# Patient Record
Sex: MALE | Race: WHITE
[De-identification: names, ages, dates, MRNs, and addresses within clinical notes are randomized per-mention and may not be internally consistent; named-entity substitution may affect disease eponyms.]

---

## 2018-08-15 ENCOUNTER — HOSPITAL ENCOUNTER (EMERGENCY)
Dept: HOSPITAL 25 - ED | Age: 83
Discharge: HOME | End: 2018-08-15
Payer: MEDICARE

## 2018-08-15 VITALS — DIASTOLIC BLOOD PRESSURE: 75 MMHG | SYSTOLIC BLOOD PRESSURE: 128 MMHG

## 2018-08-15 DIAGNOSIS — Z87.891: ICD-10-CM

## 2018-08-15 DIAGNOSIS — E11.9: ICD-10-CM

## 2018-08-15 DIAGNOSIS — I10: Primary | ICD-10-CM

## 2018-08-15 DIAGNOSIS — K58.9: ICD-10-CM

## 2018-08-15 LAB
BASOPHILS # BLD AUTO: 0.1 10^3/UL (ref 0–0.2)
EOSINOPHIL # BLD AUTO: 0.1 10^3/UL (ref 0–0.6)
HCT VFR BLD AUTO: 32 % (ref 42–52)
HGB BLD-MCNC: 11 G/DL (ref 14–18)
LYMPHOCYTES # BLD AUTO: 0.9 10^3/UL (ref 1–4.8)
MCH RBC QN AUTO: 31 PG (ref 27–31)
MCHC RBC AUTO-ENTMCNC: 35 G/DL (ref 31–36)
MCV RBC AUTO: 90 FL (ref 80–94)
MONOCYTES # BLD AUTO: 0.5 10^3/UL (ref 0–0.8)
NEUTROPHILS # BLD AUTO: 5.9 10^3/UL (ref 1.5–7.7)
NRBC # BLD AUTO: 0 10^3/UL
NRBC BLD QL AUTO: 0
PLATELET # BLD AUTO: 216 10^3/UL (ref 150–450)
RBC # BLD AUTO: 3.5 10^6/UL (ref 4–5.4)
RBC UR QL AUTO: (no result)
WBC # BLD AUTO: 7.5 10^3/UL (ref 3.5–10.8)
WBC UR QL AUTO: (no result)

## 2018-08-15 PROCEDURE — 83880 ASSAY OF NATRIURETIC PEPTIDE: CPT

## 2018-08-15 PROCEDURE — 80053 COMPREHEN METABOLIC PANEL: CPT

## 2018-08-15 PROCEDURE — 84443 ASSAY THYROID STIM HORMONE: CPT

## 2018-08-15 PROCEDURE — 85025 COMPLETE CBC W/AUTO DIFF WBC: CPT

## 2018-08-15 PROCEDURE — 81015 MICROSCOPIC EXAM OF URINE: CPT

## 2018-08-15 PROCEDURE — 82550 ASSAY OF CK (CPK): CPT

## 2018-08-15 PROCEDURE — 83605 ASSAY OF LACTIC ACID: CPT

## 2018-08-15 PROCEDURE — 87086 URINE CULTURE/COLONY COUNT: CPT

## 2018-08-15 PROCEDURE — 82553 CREATINE MB FRACTION: CPT

## 2018-08-15 PROCEDURE — 99283 EMERGENCY DEPT VISIT LOW MDM: CPT

## 2018-08-15 PROCEDURE — 36415 COLL VENOUS BLD VENIPUNCTURE: CPT

## 2018-08-15 PROCEDURE — 81003 URINALYSIS AUTO W/O SCOPE: CPT

## 2018-08-15 PROCEDURE — 83874 ASSAY OF MYOGLOBIN: CPT

## 2018-08-15 PROCEDURE — 87077 CULTURE AEROBIC IDENTIFY: CPT

## 2018-08-15 PROCEDURE — 71045 X-RAY EXAM CHEST 1 VIEW: CPT

## 2018-08-15 PROCEDURE — 87186 SC STD MICRODIL/AGAR DIL: CPT

## 2018-08-15 PROCEDURE — 93005 ELECTROCARDIOGRAM TRACING: CPT

## 2018-08-15 NOTE — RAD
INDICATION: Hypertension



COMPARISON: None

 

TECHNIQUE: An AP portable view obtained at 1709 hours is submitted.



FINDINGS: 



Bones/Soft Tissues: There are no acute bony findings.



Cardiomediastinal: The cardiomediastinal silhouette is normal. 



Lungs: There are no infiltrates.



Pleura: There are no pleural effusions. 



Other: None



IMPRESSION:  NO ACTIVE DISEASE.

## 2018-08-15 NOTE — ED
Hypertension





- HPI Summary


HPI Summary: 





This is scribe Tracy Scherer documenting for attending Kai Mora MD.





This patient is a 84 year old M BIBA to [facility] accompanied by his wife with a 

chief complaint of multiple elevated BP reading begging last night into this 

morning. Patient called his cardiologist, in [place], who stated if his blood 

pressure did not improve after an hour of taking his labetalol at 15:00 today 

he should come the Emergency department. Patient states his systolic blood 

pressure was 197. Denies chest pain or any other symptoms at this time. PMHx of 

DM and IBS.





I, Dr. Mora, personally performed the services described in this documentation 

as scribed in my presence and it is both accurate and complete.








- History of Current Complaint


Chief Complaint: EDHypertension


Stated Complaint: HIGH BLOOD PRESSURE


Time Seen by Provider: 08/15/18 16:24


Hx Obtained From: Patient


Onset/Duration: Started Days Ago


Timing: Constant


Reported Blood Pressure Prior To Arrival: systolic 197


Associated Signs & Symptoms: Negative





- Allergies/Home Medications


Allergies/Adverse Reactions: 


 Allergies











Allergy/AdvReac Type Severity Reaction Status Date / Time


 


No Known Allergies Allergy   Verified 08/15/18 15:48














PMH/Surg Hx/FS Hx/Imm Hx


Endocrine/Hematology History: Reports: Hx Diabetes


Cardiovascular History: Reports: Hx Hypertension


GI History: Reports: Hx Irritable Bowel





- Immunization History


Immunizations Up to Date: Yes


Infectious Disease History: No


Infectious Disease History: 


   Denies: Traveled Outside the [place] in Last 30 Days





- Family History


Known Family History: Positive: Hypertension





- Social History


Alcohol Use: Occasionally


Substance Use Type: Reports: None


Smoking Status (MU): Former Smoker





Review of Systems


Negative: Chest Pain


Negative: Shortness Of Breath


All Other Systems Reviewed And Are Negative: Yes





Physical Exam





- Summary


Physical Exam Summary: 





VITAL SIGNS: Reviewed.


GENERAL: Patient is a well-developed and nourished male who is lying 

comfortable in the stretcher. Patient is not in any acute respiratory distress.


HEAD AND FACE: No signs of trauma. No ecchymosis, hematomas or skull 

depressions. No sinus tenderness.


EYES: PERRLA, EOMI x 2, No injected conjunctiva, no nystagmus.


EARS: Hearing grossly intact. Ear canals and tympanic membranes are within 

normal limits.


MOUTH: Oropharynx within normal limits.


NECK: Supple, trachea is midline, no adenopathy, no JVD, no carotid bruit, no c-

spine tenderness, neck with full ROM.


CHEST: Symmetric, no tenderness at palpation


LUNGS: Clear to auscultation bilaterally. No wheezing or crackles.


CVS: Regular rate and rhythm, S1 and S2 present, no murmurs or gallops 

appreciated.


ABDOMEN: Soft, non-tender. No signs of distention. No rebound no guarding, and 

no masses palpated. Bowel sounds are normal.


EXTREMITIES: FROM in all major joints, no edema, no cyanosis or clubbing. AKA 

bilaterally. 


NEURO: Alert and oriented x 3. No acute neurological deficits. Speech is normal 

and follows commands.


SKIN: Dry and warm





Triage Information Reviewed: Yes


Vital Signs On Initial Exam: 


 Initial Vitals











Temp Pulse Resp BP Pulse Ox


 


 99.1 F   70   16   166/70   95 


 


 08/15/18 15:37  08/15/18 15:37  08/15/18 15:37  08/15/18 15:37  08/15/18 15:37











Vital Signs Reviewed: Yes





Diagnostics





- Vital Signs


 Vital Signs











  Temp Pulse Resp BP Pulse Ox


 


 08/15/18 16:00   68    94


 


 08/15/18 15:50   72   168/76  95


 


 08/15/18 15:37  99.1 F  70  16  166/70  95














- Laboratory


Result Diagrams: 


 08/15/18 16:46





 08/15/18 16:46


Lab Statement: Any lab studies that have been ordered have been reviewed, and 

results considered in the medical decision making process.





- Radiology


  ** CXR


Radiology Interpretation Completed By: Radiologist - NO ACTIVE DISEASE. ED 

Physican has reviewed this report.





- EKG


  ** 1701


Cardiac Rate: NL - 67 BPM


EKG Rhythm: Sinus Rhythm


EKG Interpretation: no ST elevations





Hypertension Course/Dx





- Course


Assessment/Plan: This patient is an 84-year-old male who presents to the 

emergency department with a chief complaint of having increased blood 

pressures.   Patient denies any symptoms.  Patient denies any chest pain, SOB, 

and palpitations, patient denies any headache or dizziness.  Blood test results 

without any significant abnormality except for slight anemia, urinalysis with 

positive leukocytes and white blood cell count and yeast.  At this time I 

discussed my physical exam and findings and test results with the patient and 

the patients wife and  over antibiotics for the UTI however he wants to wait 

until we get in the urine culture.  He reports that he doesnt have any dysuria 

or hematuria and urinary frequency or fevers.  In the ED course also the blood 

pressures have been within normal limits.  Before discharge the patients blood 

pressure is 133/58.  Therefore the patient was discharged home with follow-up 

with PCP.  I discussed all the findings and test results with the patient. 

Patient was instructed to return to the emergency room immediately if any of 

the symptoms return or worsens. Plan of care was discussed with the patient and 

understands and agrees. All questions were answered at patient satisfaction.  

There were no further complaints or concerns.  Lung exam before discharge: CTA B

/L. Good air exchange. No wheezing or crackles heard. CVS: S1 and S2 present. 

No murmurs appreciated. Patient is alert and oriented x 3. Patient is 

hemodynamically stable. Patient will be discharged home with follow up PCP in 

the next 2-3 days





- Diagnoses


Differential Diagnosis/HQI PQRI: Hypertension, Hypertensive Crisis, 

Hypertensive Urgency


Provider Diagnoses: 


 Uncontrolled hypertension








Discharge





- Sign-Out/Discharge


Documenting (check all that apply): Patient Departure





- Discharge Plan


Condition: Stable


Disposition: HOME


Patient Education Materials:  Chronic Hypertension (ED)


Referrals: 


[facility] PHYSICIAN REFERRAL [Outside] - 2 Days (Follow up with your primary care 

physican regarding your elevated blood pressure. Call for a physician referreal 

if a primary care physcian is needed in the area. )


No Primary Care Phys,NOPCP [Primary Care Provider] - 


Additional Instructions: 


FOLLOW UP WITH YOUR PRIMARY CARE PROVIDER WITHIN ONE WEEK FOR HIGH BLOOD 

PRESSURE NOTED TODAY.


RETURN TO THE EMERGENCY DEPARTMENT FOR ANY WORSENING OR NEW SYMPTOMS.





- Billing Disposition and Condition


Condition: STABLE


Disposition: Home

## 2018-08-27 ENCOUNTER — HOSPITAL ENCOUNTER (EMERGENCY)
Dept: HOSPITAL 25 - ED | Age: 83
Discharge: HOME | End: 2018-08-27
Payer: MEDICARE

## 2018-08-27 VITALS — DIASTOLIC BLOOD PRESSURE: 61 MMHG | SYSTOLIC BLOOD PRESSURE: 127 MMHG

## 2018-08-27 DIAGNOSIS — Z87.891: ICD-10-CM

## 2018-08-27 DIAGNOSIS — E11.9: ICD-10-CM

## 2018-08-27 DIAGNOSIS — I10: Primary | ICD-10-CM

## 2018-08-27 PROCEDURE — 99282 EMERGENCY DEPT VISIT SF MDM: CPT

## 2018-08-27 NOTE — ED
Hypertension





- HPI Summary


HPI Summary: 


A 83 y/o male presents to ED c/o high blood pressure. In the ED room, the 

patient has a pulse of 64 BPM, O2 saturation of 96% and blood pressure of 127/

61. As per triage, "pt here with c/o HBP". According to the patient, he has 

been experiencing HBP. He noted that his BP went up to 200 systolic, but in EMS 

it went down to 152 BPM and now currently it is approximately 126. He stated 

that he was told by his cardiologist in [place], to come to ED if blood 

pressure is higher than 160 systolic. He wants to see a cardiologist at [facility] as 

soon as possible. As per wife, the patient was recently diagnosed with a 

bladder infection of 07/15. He has no other symptoms, denies any dizziness, 

headache, vision changes and chest pain. PMHx of HBP, AKA. The patient takes 

several medications and takes them as prescribed by his MD. Patient checks BP 

in AM and PM when he is in [place], but he has been checking it several times 

more when his BP is high. Blood sugar was high this AM, but it was been running 

"pretty good most of the time". BP machine is new and BP cuff is a good size. 

Will go back to [place] by end of September. 








- History of Current Complaint


Chief Complaint: EDHypertension


Stated Complaint: HIGH BP


Time Seen by Provider: 08/27/18 22:59


Hx Obtained From: Patient


Onset/Duration: Started Hours Ago, Resolved


Timing: Intermittent


Aggravating Factor(s): Nothing


Alleviating Factor(s): Nothing


Associated Signs & Symptoms: Negative





- Allergies/Home Medications


Allergies/Adverse Reactions: 


 Allergies











Allergy/AdvReac Type Severity Reaction Status Date / Time


 


No Known Allergies Allergy   Verified 08/27/18 22:56











Home Medications: 


 Home Medications





Ascorbic Acid TAB* [Vitamin C  TAB*] 500 mg PO DAILY 08/27/18 [History 

Confirmed 08/27/18]


Aspirin 81 mg CHEW TAB* [Aspirin Low Dose TAB*] 81 mg PO DAILY 08/27/18 [

History Confirmed 08/27/18]


Clopidogrel TAB* [Plavix TAB*] 75 mg PO DAILY 08/27/18 [History Confirmed 08/27/ 18]


Dicyclomine CAP* [Bentyl CAP*] 10 mg PO TID PRN 08/27/18 [History Confirmed 08/ 27/18]


Gabapentin 300 mg PO TID 08/27/18 [History Confirmed 08/27/18]


Insulin NPH(*) 20 units SUBCUT BID 08/27/18 [History Confirmed 08/27/18]


Iron 18 mg PO DAILY 08/27/18 [History Confirmed 08/27/18]


Labetalol HCl 200 mg PO DAILY 08/27/18 [History Confirmed 08/27/18]


MinoXIDil TAB* [Loniten TAB*] 2.5 mg PO DAILY 08/27/18 [History Confirmed 08/27/ 18]


Multivit-Min/FA/Lycopen/Lutein [Centrum Silver Men Tablet] 1 each PO DAILY 08/27 /18 [History Confirmed 08/27/18]


Potassium Chlor TAB* [Klor Con ER TAB*] 10 meq PO DAILY 08/27/18 [History 

Confirmed 08/27/18]


Valsartan/Hydrochlorothiazide [Valsartan/Hydrochlorothia] 1 tab PO DAILY 08/27/ 18 [History Confirmed 08/27/18]


Vitamin B Complex CAP* [B Complex CAP*] 1 cap PO DAILY 08/27/18 [History 

Confirmed 08/27/18]











PMH/Surg Hx/FS Hx/Imm Hx


Endocrine/Hematology History: Reports: Hx Diabetes


Cardiovascular History: Reports: Hx Hypertension


GI History: Reports: Hx Irritable Bowel





- Surgical History


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


Infectious Disease History: No


Infectious Disease History: 


   Denies: Traveled Outside the [place] in Last 30 Days





- Family History


Known Family History: Positive: Hypertension





- Social History


Alcohol Use: Occasionally


Substance Use Type: Reports: None


Smoking Status (MU): Former Smoker





Review of Systems


Negative: Fever


Negative: Blurred Vision


Positive: Other - POSITIVE: Resolved HBP.  Negative: Chest Pain


Neurological: Other - NEGATIVE: Dizziness


Negative: Headache


Positive: Anxious


All Other Systems Reviewed And Are Negative: Yes





Physical Exam





- Summary


Physical Exam Summary: 


VITAL SIGNS: Reviewed.


GENERAL: Patient is a well-developed and nourished male who is lying 

comfortable in the stretcher. Patient is not in any acute respiratory distress. 

Patient is worried about BP. 


HEAD AND FACE: No signs of trauma. No ecchymosis, hematomas or skull 

depressions. No sinus tenderness.


EYES: PERRLA, EOMI x 2, No injected conjunctiva, no nystagmus.


EARS: Hearing grossly intact. Ear canals and tympanic membranes are within 

normal limits.


MOUTH: Oropharynx within normal limits.


NECK: Supple, trachea is midline, no adenopathy, no JVD, no carotid bruit, no c-

spine tenderness, neck with full ROM.


CHEST: Symmetric, no tenderness at palpation


LUNGS: Clear to auscultation bilaterally. No wheezing or crackles.


CVS: Regular rate and rhythm, S1 and S2 present, no murmurs or gallops 

appreciated.


ABDOMEN: Soft, non-tender. No signs of distention. No rebound no guarding, and 

no masses palpated. Bowel sounds are normal.


EXTREMITIES: FROM in all major joints, no edema, no cyanosis or clubbing. 

Bilateral AKA


NEURO: Alert and oriented x 3. No acute neurological deficits. Speech is normal 

and follows commands.


SKIN: Dry and warm


Psych: Patient is anxious





Triage Information Reviewed: Yes


Vital Signs On Initial Exam: 


 Initial Vitals











Temp Pulse Resp BP Pulse Ox


 


 98 F   64   16   127/61   95 


 


 08/27/18 22:56  08/27/18 22:56  08/27/18 22:56  08/27/18 22:56  08/27/18 22:56











Vital Signs Reviewed: Yes





Diagnostics





- Vital Signs


 Vital Signs











  Temp Pulse Resp BP Pulse Ox


 


 08/27/18 22:56  98 F  64  16  127/61  95














- Laboratory


Lab Statement: Any lab studies that have been ordered have been reviewed, and 

results considered in the medical decision making process.





Hypertension Course/Dx





- Course


Course Of Treatment: A 83 y/o male presents to ED c/o high blood pressure. In 

the ED room, the patient has a pulse of 64 BPM, O2 saturation of 96% and blood 

pressure of 127/61. No laboratory scans were done. No blood work was done. In 

the ED course, the patient received no medications. Patient is anxious and very 

worried about blood pressure. But, the patient was reassurred that his current 

blood pressure is within normal limits. In the ED room, the patient has a pulse 

of 64 BPM, O2 saturation of 96% and blood pressure of 127/61. Patient agreed to 

be discharged and will follow up with cardiologist. Patient will be discharged 

with a diagnosis HTN. Patient is to follow up with cardiologist tomorrow. 

Patient is agreeable with this plan.





- Diagnoses


Provider Diagnoses: 


 HTN (hypertension)








Discharge





- Sign-Out/Discharge


Documenting (check all that apply): Patient Departure - DISCHARGE





- Discharge Plan


Condition: Stable


Disposition: HOME


Patient Education Materials:  Chronic Hypertension (ED)


Referrals: 


Maghaydah,Qutaybeh, MD [Medical Doctor] - 1 Day


Additional Instructions: 


FOLLOW UP WITH CARDIOLOGY TOMORROW.





RETURN TO THE ED FOR ANY NEW OR WORSENING SYMPTOMS.





- Attestation Statements


Document Initiated by Scribe: Yes


Documenting Scribe: Tyler Farooq


Provider For Whom Scribe is Documenting (Include Credential): Bob Guerra


Scrlucio Attestation: 


Tyler BRADLEY, scribed for Bob Guerra on 08/27/18 at 2323.

## 2019-09-10 ENCOUNTER — APPOINTMENT (RX ONLY)
Dept: URBAN - METROPOLITAN AREA CLINIC 131 | Facility: CLINIC | Age: 84
Setting detail: DERMATOLOGY
End: 2019-09-10

## 2019-09-10 DIAGNOSIS — L57.0 ACTINIC KERATOSIS: ICD-10-CM

## 2019-09-10 DIAGNOSIS — D485 NEOPLASM OF UNCERTAIN BEHAVIOR OF SKIN: ICD-10-CM

## 2019-09-10 DIAGNOSIS — L82.1 OTHER SEBORRHEIC KERATOSIS: ICD-10-CM

## 2019-09-10 DIAGNOSIS — D18.0 HEMANGIOMA: ICD-10-CM

## 2019-09-10 PROBLEM — E13.9 OTHER SPECIFIED DIABETES MELLITUS WITHOUT COMPLICATIONS: Status: ACTIVE | Noted: 2019-09-10

## 2019-09-10 PROBLEM — I10 ESSENTIAL (PRIMARY) HYPERTENSION: Status: ACTIVE | Noted: 2019-09-10

## 2019-09-10 PROBLEM — D48.5 NEOPLASM OF UNCERTAIN BEHAVIOR OF SKIN: Status: ACTIVE | Noted: 2019-09-10

## 2019-09-10 PROBLEM — H91.90 UNSPECIFIED HEARING LOSS, UNSPECIFIED EAR: Status: ACTIVE | Noted: 2019-09-10

## 2019-09-10 PROBLEM — D18.01 HEMANGIOMA OF SKIN AND SUBCUTANEOUS TISSUE: Status: ACTIVE | Noted: 2019-09-10

## 2019-09-10 PROCEDURE — 17000 DESTRUCT PREMALG LESION: CPT | Mod: 59

## 2019-09-10 PROCEDURE — ? BIOPSY BY SHAVE METHOD

## 2019-09-10 PROCEDURE — 17003 DESTRUCT PREMALG LES 2-14: CPT

## 2019-09-10 PROCEDURE — ? PRESCRIPTION

## 2019-09-10 PROCEDURE — ? LIQUID NITROGEN

## 2019-09-10 PROCEDURE — 99203 OFFICE O/P NEW LOW 30 MIN: CPT | Mod: 25

## 2019-09-10 PROCEDURE — 11102 TANGNTL BX SKIN SINGLE LES: CPT

## 2019-09-10 RX ORDER — MUPIROCIN 20 MG/G
OINTMENT TOPICAL
Qty: 1 | Refills: 2 | Status: ERX | COMMUNITY
Start: 2019-09-10

## 2019-09-10 RX ADMIN — MUPIROCIN 1: 20 OINTMENT TOPICAL at 00:00

## 2019-09-10 ASSESSMENT — LOCATION DETAILED DESCRIPTION DERM
LOCATION DETAILED: LEFT DISTAL DORSAL FOREARM
LOCATION DETAILED: RIGHT SUPERIOR LATERAL MALAR CHEEK
LOCATION DETAILED: PERIUMBILICAL SKIN
LOCATION DETAILED: RIGHT LATERAL INFERIOR EYELID
LOCATION DETAILED: LEFT INFERIOR POSTERIOR HELIX
LOCATION DETAILED: RIGHT SUPERIOR POSTERIOR HELIX
LOCATION DETAILED: RIGHT BUTTOCK

## 2019-09-10 ASSESSMENT — LOCATION SIMPLE DESCRIPTION DERM
LOCATION SIMPLE: RIGHT INFERIOR EYELID
LOCATION SIMPLE: LEFT EAR
LOCATION SIMPLE: RIGHT BUTTOCK
LOCATION SIMPLE: RIGHT EAR
LOCATION SIMPLE: LEFT FOREARM
LOCATION SIMPLE: ABDOMEN
LOCATION SIMPLE: RIGHT CHEEK

## 2019-09-10 ASSESSMENT — LOCATION ZONE DERM
LOCATION ZONE: EYELID
LOCATION ZONE: TRUNK
LOCATION ZONE: FACE
LOCATION ZONE: EAR
LOCATION ZONE: ARM

## 2019-09-10 NOTE — PROCEDURE: LIQUID NITROGEN
Number Of Freeze-Thaw Cycles: 1 freeze-thaw cycle
Render Post-Care Instructions In Note?: yes
Consent: The patient's consent was obtained including but not limited to risks of crusting, scabbing, blistering, scarring, darker or lighter pigmentary change, recurrence, incomplete removal and infection.
Post-Care Instructions: I reviewed with the patient in detail post-care instructions. Patient is to wear sunprotection, and avoid picking at any of the treated lesions. Pt may apply Vaseline to crusted or scabbing areas.
Duration Of Freeze Thaw-Cycle (Seconds): 5
Detail Level: Simple
Render Note In Bullet Format When Appropriate: No

## 2019-09-10 NOTE — PROCEDURE: MIPS QUALITY
Quality 130: Documentation Of Current Medications In The Medical Record: Current Medications Documented
Detail Level: Detailed
Quality 265: Biopsy Follow-Up: Biopsy results reviewed, communicated, tracked, and documented
Additional Notes: Patient states pain as a negative and on a scale from 0-10 the pain level is 0.
Quality 131: Pain Assessment And Follow-Up: Pain assessment using a standardized tool is documented as negative, no follow-up plan required

## 2019-09-10 NOTE — PROCEDURE: BIOPSY BY SHAVE METHOD
Post-Care Instructions: I reviewed with the patient in detail post-care instructions. Patient is to keep the biopsy site dry overnight, and then apply bacitracin twice daily until healed. Patient may apply hydrogen peroxide soaks to remove any crusting.
Detail Level: Simple
Lab: [facility]
Lab Facility: [address]
Additional Anesthesia Volume In Cc (Will Not Render If 0): 0
Cryotherapy Text: The wound bed was treated with cryotherapy after the biopsy was performed.
Destruction After The Procedure: No
Anesthesia Type: 2% lidocaine with epinephrine
Wound Care: Vaseline
Size Of Lesion In Cm: 0.5
Consent: Written consent was obtained and risks were reviewed including but not limited to scarring, infection, bleeding, scabbing, incomplete removal, nerve damage and allergy to anesthesia.
Depth Of Biopsy: dermis
Electrodesiccation Text: The wound bed was treated with electrodesiccation after the biopsy was performed.
Electrodesiccation And Curettage Text: The wound bed was treated with electrodesiccation and curettage after the biopsy was performed.
Type Of Destruction Used: Curettage
Biopsy Type: H and E
Body Location Override (Optional - Billing Will Still Be Based On Selected Body Map Location If Applicable): right buttock
Dressing: pressure dressing
Render Post-Care Instructions In Note?: yes
Hemostasis: TCA 25%
Silver Nitrate Text: The wound bed was treated with silver nitrate after the biopsy was performed.
Curettage Text: The edges were curetted to assure complete removal
Billing Type: [employer]
Biopsy Method: 15 blade

## 2019-11-11 ENCOUNTER — APPOINTMENT (RX ONLY)
Dept: URBAN - METROPOLITAN AREA CLINIC 131 | Facility: CLINIC | Age: 84
Setting detail: DERMATOLOGY
End: 2019-11-11

## 2019-11-11 DIAGNOSIS — B07.8 OTHER VIRAL WARTS: ICD-10-CM

## 2019-11-11 PROCEDURE — 17110 DESTRUCTION B9 LES UP TO 14: CPT

## 2019-11-11 PROCEDURE — ? LIQUID NITROGEN

## 2019-11-11 ASSESSMENT — LOCATION SIMPLE DESCRIPTION DERM: LOCATION SIMPLE: RIGHT BUTTOCK

## 2019-11-11 ASSESSMENT — LOCATION DETAILED DESCRIPTION DERM: LOCATION DETAILED: RIGHT BUTTOCK

## 2019-11-11 ASSESSMENT — LOCATION ZONE DERM: LOCATION ZONE: TRUNK

## 2019-11-11 NOTE — PROCEDURE: LIQUID NITROGEN
Add 52 Modifier (Optional): no
Medical Necessity Clause: This procedure was medically necessary because the lesions that were treated were:there were actinic keratosis changes.
Number Of Freeze-Thaw Cycles: 2 freeze-thaw cycles
Medical Necessity Information: It is in your best interest to select a reason for this procedure from the list below. All of these items fulfill various CMS LCD requirements except the new and changing color options.
Post-Care Instructions: I reviewed with the patient in detail post-care instructions. Patient is to wear sunprotection, and avoid picking at any of the treated lesions. Pt may apply Vaseline to crusted or scabbing areas.  PHOTO WAS TAKEN
Detail Level: Simple
Consent: The patient's consent was obtained including but not limited to risks of crusting, scabbing, blistering, scarring, darker or lighter pigmentary change, recurrence, incomplete removal and infection.

## 2020-07-16 ENCOUNTER — APPOINTMENT (RX ONLY)
Dept: URBAN - METROPOLITAN AREA CLINIC 131 | Facility: CLINIC | Age: 85
Setting detail: DERMATOLOGY
End: 2020-07-16

## 2020-07-16 VITALS — TEMPERATURE: 99.1 F

## 2020-07-16 DIAGNOSIS — D485 NEOPLASM OF UNCERTAIN BEHAVIOR OF SKIN: ICD-10-CM

## 2020-07-16 DIAGNOSIS — D18.0 HEMANGIOMA: ICD-10-CM

## 2020-07-16 DIAGNOSIS — L57.0 ACTINIC KERATOSIS: ICD-10-CM

## 2020-07-16 DIAGNOSIS — L98429 CHRONIC ULCER OF OTHER SPECIFIED SITES: ICD-10-CM

## 2020-07-16 DIAGNOSIS — L98419 CHRONIC ULCER OF OTHER SPECIFIED SITES: ICD-10-CM

## 2020-07-16 PROBLEM — D48.5 NEOPLASM OF UNCERTAIN BEHAVIOR OF SKIN: Status: ACTIVE | Noted: 2020-07-16

## 2020-07-16 PROBLEM — L98.419 NON-PRESSURE CHRONIC ULCER OF BUTTOCK WITH UNSPECIFIED SEVERITY: Status: ACTIVE | Noted: 2020-07-16

## 2020-07-16 PROBLEM — D18.01 HEMANGIOMA OF SKIN AND SUBCUTANEOUS TISSUE: Status: ACTIVE | Noted: 2020-07-16

## 2020-07-16 PROCEDURE — 11102 TANGNTL BX SKIN SINGLE LES: CPT

## 2020-07-16 PROCEDURE — 17000 DESTRUCT PREMALG LESION: CPT | Mod: 59

## 2020-07-16 PROCEDURE — ? COUNSELING

## 2020-07-16 PROCEDURE — ? ADDITIONAL NOTES

## 2020-07-16 PROCEDURE — ? LIQUID NITROGEN

## 2020-07-16 PROCEDURE — 99214 OFFICE O/P EST MOD 30 MIN: CPT | Mod: 25

## 2020-07-16 PROCEDURE — ? BIOPSY BY SHAVE METHOD

## 2020-07-16 ASSESSMENT — LOCATION ZONE DERM
LOCATION ZONE: FACE
LOCATION ZONE: TRUNK

## 2020-07-16 ASSESSMENT — LOCATION DETAILED DESCRIPTION DERM
LOCATION DETAILED: LEFT LATERAL TEMPLE
LOCATION DETAILED: GLUTEAL CLEFT
LOCATION DETAILED: RIGHT LATERAL ABDOMEN
LOCATION DETAILED: LEFT CENTRAL ZYGOMA

## 2020-07-16 ASSESSMENT — LOCATION SIMPLE DESCRIPTION DERM
LOCATION SIMPLE: GLUTEAL CLEFT
LOCATION SIMPLE: ABDOMEN
LOCATION SIMPLE: LEFT TEMPLE
LOCATION SIMPLE: LEFT ZYGOMA

## 2020-07-16 NOTE — PROCEDURE: LIQUID NITROGEN
Render Note In Bullet Format When Appropriate: No
Duration Of Freeze Thaw-Cycle (Seconds): 5
Number Of Freeze-Thaw Cycles: 1 freeze-thaw cycle
Post-Care Instructions: I reviewed with the patient in detail post-care instructions. Patient is to wear sunprotection, and avoid picking at any of the treated lesions. Pt may apply Vaseline to crusted or scabbing areas.
Consent: The patient's consent was obtained including but not limited to risks of crusting, scabbing, blistering, scarring, darker or lighter pigmentary change, recurrence, incomplete removal and infection.
Detail Level: Simple

## 2020-07-16 NOTE — PROCEDURE: BIOPSY BY SHAVE METHOD
Body Location Override (Optional - Billing Will Still Be Based On Selected Body Map Location If Applicable): left sideburn
Detail Level: Simple
Depth Of Biopsy: dermis
Was A Bandage Applied: Yes
Size Of Lesion In Cm: 1
Biopsy Type: H and E
Biopsy Method: Dermablade
Anesthesia Type: 2% lidocaine with epinephrine and a 1:10 solution of 8.4% sodium bicarbonate
Anesthesia Volume In Cc (Will Not Render If 0): 0.5
Additional Anesthesia Volume In Cc (Will Not Render If 0): 0
Hemostasis: Aluminum Chloride
Wound Care: Vaseline
Dressing: bandage
Destruction After The Procedure: No
Type Of Destruction Used: Curettage
Cryotherapy Text: The wound bed was treated with cryotherapy after the biopsy was performed.
Electrodesiccation Text: The wound bed was treated with electrodesiccation after the biopsy was performed.
Electrodesiccation And Curettage Text: The wound bed was treated with electrodesiccation and curettage after the biopsy was performed.
Silver Nitrate Text: The wound bed was treated with silver nitrate after the biopsy was performed.
Lab: [facility] [place]
Lab Facility: [address]
Consent: The provider's intent is to obtain a tissue sample solely for diagnostic purposes. Written consent to obtain tissue sample was obtained and risks were reviewed including but not limited to scarring, infection, bleeding, scabbing, incomplete removal, nerve damage and allergy to anesthesia.
Post-Care Instructions: I reviewed with the patient in detail post-care instructions. Patient is to keep the biopsy site dry overnight, and then apply bacitracin twice daily until healed. Patient may apply hydrogen peroxide soaks to remove any crusting.
Notification Instructions: Patient will be notified of biopsy results. However, patient instructed to call the office if not contacted within 2 weeks.
Billing Type: [employer]
Information: Selecting Yes will display possible errors in your note based on the variables you have selected. This validation is only offered as a suggestion for you. PLEASE NOTE THAT THE VALIDATION TEXT WILL BE REMOVED WHEN YOU FINALIZE YOUR NOTE. IF YOU WANT TO FAX A PRELIMINARY NOTE YOU WILL NEED TO TOGGLE THIS TO 'NO' IF YOU DO NOT WANT IT IN YOUR FAXED NOTE.

## 2020-08-10 ENCOUNTER — APPOINTMENT (RX ONLY)
Dept: URBAN - METROPOLITAN AREA CLINIC 131 | Facility: CLINIC | Age: 85
Setting detail: DERMATOLOGY
End: 2020-08-10

## 2020-08-10 PROBLEM — C44.91 BASAL CELL CARCINOMA OF SKIN, UNSPECIFIED: Status: ACTIVE | Noted: 2020-08-10

## 2020-08-10 PROCEDURE — 99212 OFFICE O/P EST SF 10 MIN: CPT

## 2020-08-10 PROCEDURE — ? PRESCRIPTION

## 2020-08-10 RX ORDER — FLUOROURACIL 2 G/40G
CREAM TOPICAL BID
Qty: 1 | Refills: 0 | Status: ERX | COMMUNITY
Start: 2020-08-10

## 2020-08-10 RX ADMIN — FLUOROURACIL 1: 2 CREAM TOPICAL at 00:00

## 2020-10-29 ENCOUNTER — APPOINTMENT (RX ONLY)
Dept: URBAN - METROPOLITAN AREA CLINIC 131 | Facility: CLINIC | Age: 85
Setting detail: DERMATOLOGY
End: 2020-10-29

## 2020-10-29 VITALS — TEMPERATURE: 99.1 F

## 2020-10-29 DIAGNOSIS — B37.2 CANDIDIASIS OF SKIN AND NAIL: ICD-10-CM

## 2020-10-29 DIAGNOSIS — Z85.828 PERSONAL HISTORY OF OTHER MALIGNANT NEOPLASM OF SKIN: ICD-10-CM

## 2020-10-29 PROCEDURE — ? COUNSELING

## 2020-10-29 PROCEDURE — ? PRESCRIPTION

## 2020-10-29 PROCEDURE — 99213 OFFICE O/P EST LOW 20 MIN: CPT

## 2020-10-29 RX ORDER — NYSTATIN 100000 [USP'U]/G
POWDER TOPICAL BID
Qty: 60 | Refills: 3 | Status: ERX | COMMUNITY
Start: 2020-10-29

## 2020-10-29 RX ORDER — TRIAMCINOLONE ACETONIDE 1 MG/G
CREAM TOPICAL QHS
Qty: 454 | Refills: 2 | Status: ERX | COMMUNITY
Start: 2020-10-29

## 2020-10-29 RX ADMIN — NYSTATIN 1: 100000 POWDER TOPICAL at 00:00

## 2020-10-29 RX ADMIN — TRIAMCINOLONE ACETONIDE 1: 1 CREAM TOPICAL at 00:00

## 2020-10-29 ASSESSMENT — LOCATION SIMPLE DESCRIPTION DERM
LOCATION SIMPLE: GROIN
LOCATION SIMPLE: LEFT CHEEK
LOCATION SIMPLE: LEFT THIGH
LOCATION SIMPLE: RIGHT THIGH

## 2020-10-29 ASSESSMENT — LOCATION DETAILED DESCRIPTION DERM
LOCATION DETAILED: LEFT LATERAL MALAR CHEEK
LOCATION DETAILED: RIGHT ANTERIOR PROXIMAL THIGH
LOCATION DETAILED: LEFT ANTERIOR PROXIMAL THIGH
LOCATION DETAILED: LEFT INGUINAL CREASE

## 2020-10-29 ASSESSMENT — LOCATION ZONE DERM
LOCATION ZONE: FACE
LOCATION ZONE: LEG
LOCATION ZONE: TRUNK

## 2021-01-07 ENCOUNTER — APPOINTMENT (RX ONLY)
Dept: URBAN - METROPOLITAN AREA CLINIC 131 | Facility: CLINIC | Age: 86
Setting detail: DERMATOLOGY
End: 2021-01-07

## 2021-01-07 DIAGNOSIS — Z48.817 ENCOUNTER FOR SURGICAL AFTERCARE FOLLOWING SURGERY ON THE SKIN AND SUBCUTANEOUS TISSUE: ICD-10-CM

## 2021-01-07 PROCEDURE — ? POST-OP WOUND EVALUATION

## 2021-01-07 PROCEDURE — 99024 POSTOP FOLLOW-UP VISIT: CPT

## 2021-01-07 ASSESSMENT — LOCATION DETAILED DESCRIPTION DERM: LOCATION DETAILED: LEFT LATERAL ZYGOMA

## 2021-01-07 ASSESSMENT — LOCATION ZONE DERM: LOCATION ZONE: FACE

## 2021-01-07 ASSESSMENT — LOCATION SIMPLE DESCRIPTION DERM: LOCATION SIMPLE: LEFT ZYGOMA

## 2021-01-07 NOTE — PROCEDURE: POST-OP WOUND EVALUATION
Body Location Override (Optional): left sideburn
Detail Level: Detailed
Add 22298 Cpt? (Important Note: In 2017 The Use Of 77226 Is Being Tracked By Cms To Determine Future Global Period Reimbursement For Global Periods): yes
Wound Evaluated By (Optional): Dr Arleth Perry
Wound Diameter In Cm(Optional): 0
Any New Or Residual Neoplasm?: No

## 2021-01-19 ENCOUNTER — APPOINTMENT (RX ONLY)
Dept: URBAN - METROPOLITAN AREA CLINIC 131 | Facility: CLINIC | Age: 86
Setting detail: DERMATOLOGY
End: 2021-01-19

## 2021-01-19 VITALS — TEMPERATURE: 99.7 F

## 2021-01-19 DIAGNOSIS — L24.4 IRRITANT CONTACT DERMATITIS DUE TO DRUGS IN CONTACT WITH SKIN: ICD-10-CM

## 2021-01-19 PROCEDURE — ? COUNSELING

## 2021-01-19 PROCEDURE — ? PRESCRIPTION MEDICATION MANAGEMENT

## 2021-01-19 PROCEDURE — 99213 OFFICE O/P EST LOW 20 MIN: CPT

## 2021-01-19 ASSESSMENT — LOCATION DETAILED DESCRIPTION DERM: LOCATION DETAILED: NASAL SUPRATIP

## 2021-01-19 ASSESSMENT — LOCATION ZONE DERM: LOCATION ZONE: NOSE

## 2021-01-19 ASSESSMENT — LOCATION SIMPLE DESCRIPTION DERM: LOCATION SIMPLE: NOSE

## 2021-07-08 ENCOUNTER — APPOINTMENT (RX ONLY)
Dept: URBAN - METROPOLITAN AREA CLINIC 131 | Facility: CLINIC | Age: 86
Setting detail: DERMATOLOGY
End: 2021-07-08

## 2021-07-08 DIAGNOSIS — L57.0 ACTINIC KERATOSIS: ICD-10-CM

## 2021-07-08 DIAGNOSIS — L82.1 OTHER SEBORRHEIC KERATOSIS: ICD-10-CM

## 2021-07-08 DIAGNOSIS — D18.0 HEMANGIOMA: ICD-10-CM

## 2021-07-08 DIAGNOSIS — E85.4 ORGAN-LIMITED AMYLOIDOSIS: ICD-10-CM

## 2021-07-08 PROBLEM — D18.01 HEMANGIOMA OF SKIN AND SUBCUTANEOUS TISSUE: Status: ACTIVE | Noted: 2021-07-08

## 2021-07-08 PROCEDURE — ? LIQUID NITROGEN

## 2021-07-08 PROCEDURE — 17003 DESTRUCT PREMALG LES 2-14: CPT

## 2021-07-08 PROCEDURE — ? COUNSELING

## 2021-07-08 PROCEDURE — 99212 OFFICE O/P EST SF 10 MIN: CPT | Mod: 25

## 2021-07-08 PROCEDURE — 17000 DESTRUCT PREMALG LESION: CPT

## 2021-07-08 ASSESSMENT — LOCATION SIMPLE DESCRIPTION DERM
LOCATION SIMPLE: RIGHT LOWER BACK
LOCATION SIMPLE: LEFT CHEEK
LOCATION SIMPLE: LEFT TEMPLE

## 2021-07-08 ASSESSMENT — LOCATION DETAILED DESCRIPTION DERM
LOCATION DETAILED: LEFT MEDIAL TEMPLE
LOCATION DETAILED: RIGHT SUPERIOR LATERAL MIDBACK
LOCATION DETAILED: LEFT SUPERIOR MEDIAL MALAR CHEEK

## 2021-07-08 ASSESSMENT — LOCATION ZONE DERM
LOCATION ZONE: FACE
LOCATION ZONE: TRUNK

## 2022-01-13 ENCOUNTER — APPOINTMENT (RX ONLY)
Dept: URBAN - METROPOLITAN AREA CLINIC 131 | Facility: CLINIC | Age: 87
Setting detail: DERMATOLOGY
End: 2022-01-13

## 2022-01-13 DIAGNOSIS — L82.1 OTHER SEBORRHEIC KERATOSIS: ICD-10-CM

## 2022-01-13 DIAGNOSIS — D18.0 HEMANGIOMA: ICD-10-CM

## 2022-01-13 DIAGNOSIS — D49.2 NEOPLASM OF UNSPECIFIED BEHAVIOR OF BONE, SOFT TISSUE, AND SKIN: ICD-10-CM

## 2022-01-13 DIAGNOSIS — L85.3 XEROSIS CUTIS: ICD-10-CM

## 2022-01-13 PROBLEM — D18.01 HEMANGIOMA OF SKIN AND SUBCUTANEOUS TISSUE: Status: ACTIVE | Noted: 2022-01-13

## 2022-01-13 PROCEDURE — 99213 OFFICE O/P EST LOW 20 MIN: CPT | Mod: 25

## 2022-01-13 PROCEDURE — ? SHAVE REMOVAL

## 2022-01-13 PROCEDURE — 11301 SHAVE SKIN LESION 0.6-1.0 CM: CPT

## 2022-01-13 PROCEDURE — ? PRESCRIPTION

## 2022-01-13 PROCEDURE — 11102 TANGNTL BX SKIN SINGLE LES: CPT | Mod: 59

## 2022-01-13 PROCEDURE — ? BIOPSY BY SHAVE METHOD

## 2022-01-13 PROCEDURE — ? COUNSELING

## 2022-01-13 RX ORDER — BETAMETHASONE DIPROPIONATE 0.5 MG/G
1 CREAM, AUGMENTED TOPICAL QD
Qty: 15 | Refills: 3 | Status: ERX | COMMUNITY
Start: 2022-01-13

## 2022-01-13 RX ADMIN — BETAMETHASONE DIPROPIONATE 1: 0.5 CREAM, AUGMENTED TOPICAL at 00:00

## 2022-01-13 ASSESSMENT — LOCATION ZONE DERM
LOCATION ZONE: FACE
LOCATION ZONE: TRUNK
LOCATION ZONE: ARM
LOCATION ZONE: FINGER

## 2022-01-13 ASSESSMENT — LOCATION DETAILED DESCRIPTION DERM
LOCATION DETAILED: RIGHT ELBOW
LOCATION DETAILED: LEFT INFERIOR MEDIAL MALAR CHEEK
LOCATION DETAILED: RIGHT INDEX FINGERTIP
LOCATION DETAILED: RIGHT INFERIOR UPPER BACK
LOCATION DETAILED: LEFT RIB CAGE
LOCATION DETAILED: LEFT MEDIAL MALAR CHEEK
LOCATION DETAILED: RIGHT INFERIOR MEDIAL UPPER BACK
LOCATION DETAILED: EPIGASTRIC SKIN

## 2022-01-13 ASSESSMENT — LOCATION SIMPLE DESCRIPTION DERM
LOCATION SIMPLE: ABDOMEN
LOCATION SIMPLE: RIGHT ELBOW
LOCATION SIMPLE: RIGHT UPPER BACK
LOCATION SIMPLE: RIGHT INDEX FINGER
LOCATION SIMPLE: LEFT CHEEK

## 2022-01-13 NOTE — PROCEDURE: SHAVE REMOVAL
Medical Necessity Information: It is in your best interest to select a reason for this procedure from the list below. All of these items fulfill various CMS LCD requirements except the new and changing color options.
Medical Necessity Clause: This procedure was medically necessary because the lesion that was treated was:
Lab: [facility]
Lab Facility: [address]
Body Location Override (Optional - Billing Will Still Be Based On Selected Body Map Location If Applicable): right elbow
Detail Level: Detailed
Was A Bandage Applied: Yes
Size Of Lesion In Cm (Required): 0.9
Size Of Margin In Cm (Margins Are Not Added To Billing Dimensions): 0.3
Biopsy Method: Dermablade
Anesthesia Type: 2% lidocaine with epinephrine
Anesthesia Volume In Cc: 0.5
Hemostasis: Aluminum Chloride
Wound Care: Petrolatum
Path Notes (To The Dermatopathologist): Please check margins
Render Path Notes In Note?: No
Consent was obtained from the patient. The risks and benefits to therapy were discussed in detail. Specifically, the risks of infection, scarring, bleeding, prolonged wound healing, incomplete removal, allergy to anesthesia, nerve injury and recurrence were addressed. Prior to the procedure, the treatment site was clearly identified and confirmed by the patient. All components of Universal Protocol/PAUSE Rule completed.
Post-Care Instructions: I reviewed with the patient in detail, both written and verbal, post-care instructions. Patient is to keep the biopsy site dry overnight, and then apply vaseline twice daily until healed. Patient verbalized understanding.
Notification Instructions: Patient will be notified of pathology results. However, patient instructed to call the office if not contacted within 2 weeks.
Billing Type: [employer]

## 2022-01-13 NOTE — PROCEDURE: BIOPSY BY SHAVE METHOD
Detail Level: Detailed
Depth Of Biopsy: dermis
Was A Bandage Applied: Yes
Size Of Lesion In Cm: 0.5
X Size Of Lesion In Cm: 0
Biopsy Type: H and E
Biopsy Method: Dermablade
Anesthesia Type: 2% lidocaine with epinephrine
Hemostasis: Aluminum Chloride
Wound Care: Vaseline
Dressing: bandage
Destruction After The Procedure: No
Type Of Destruction Used: Curettage
Cryotherapy Text: The wound bed was treated with cryotherapy after the biopsy was performed.
Electrodesiccation Text: The wound bed was treated with electrodesiccation after the biopsy was performed.
Electrodesiccation And Curettage Text: The wound bed was treated with electrodesiccation and curettage after the biopsy was performed.
Silver Nitrate Text: The wound bed was treated with silver nitrate after the biopsy was performed.
Lab: [facility]
Lab Facility: [address]
Consent: The provider's intent is to obtain a tissue sample solely for diagnostic purposes. Written consent to obtain tissue sample was obtained and risks were reviewed including but not limited to scarring, infection, bleeding, scabbing, incomplete removal, nerve damage and allergy to anesthesia.
Post-Care Instructions: I reviewed with the patient in detail, both written and verbal, post-care instructions. Patient is to keep the biopsy site dry overnight, and then apply vaseline twice daily until healed. Patient verbalized understanding.
Notification Instructions: Patient will be notified of biopsy results. However, patient instructed to call the office if not contacted within 2 weeks.
Billing Type: [employer]
Information: Selecting Yes will display possible errors in your note based on the variables you have selected. This validation is only offered as a suggestion for you. PLEASE NOTE THAT THE VALIDATION TEXT WILL BE REMOVED WHEN YOU FINALIZE YOUR NOTE. IF YOU WANT TO FAX A PRELIMINARY NOTE YOU WILL NEED TO TOGGLE THIS TO 'NO' IF YOU DO NOT WANT IT IN YOUR FAXED NOTE.

## 2024-12-23 NOTE — HPI: FULL BODY SKIN EXAMINATION
How Severe Are Your Spot(S)?: mild
What Type Of Note Output Would You Prefer (Optional)?: Standard Output
What Is The Reason For Today's Visit?: Full Body Skin Examination with No Concerns
What Is The Reason For Today's Visit? (Being Monitored For X): concerning skin lesions on an annual basis
No